# Patient Record
Sex: FEMALE | Race: OTHER | HISPANIC OR LATINO | URBAN - METROPOLITAN AREA
[De-identification: names, ages, dates, MRNs, and addresses within clinical notes are randomized per-mention and may not be internally consistent; named-entity substitution may affect disease eponyms.]

---

## 2023-12-15 NOTE — ASU PATIENT PROFILE, ADULT - NSICDXPASTMEDICALHX_GEN_ALL_CORE_FT
PAST MEDICAL HISTORY:  No pertinent past medical history PAST MEDICAL HISTORY:  Sleep apnea, obstructive

## 2023-12-15 NOTE — ASU PATIENT PROFILE, ADULT - FALL HARM RISK - UNIVERSAL INTERVENTIONS
Bed in lowest position, wheels locked, appropriate side rails in place/Call bell, personal items and telephone in reach/Instruct patient to call for assistance before getting out of bed or chair/Non-slip footwear when patient is out of bed/[place] to call system/Physically safe environment - no spills, clutter or unnecessary equipment/Purposeful Proactive Rounding/Room/bathroom lighting operational, light cord in reach Bed in lowest position, wheels locked, appropriate side rails in place/Call bell, personal items and telephone in reach/Instruct patient to call for assistance before getting out of bed or chair/Non-slip footwear when patient is out of bed/[place] to call system/Physically safe environment - no spills, clutter or unnecessary equipment/Purposeful Proactive Rounding/Room/bathroom lighting operational, light cord in reach

## 2023-12-15 NOTE — ASU PATIENT PROFILE, ADULT - NSICDXPASTSURGICALHX_GEN_ALL_CORE_FT
PAST SURGICAL HISTORY:  No significant past surgical history PAST SURGICAL HISTORY:  H/O  section     H/O gastric sleeve

## 2023-12-15 NOTE — ASU PATIENT PROFILE, ADULT - NS PREOP UNDERSTANDS INFO
Spoke to patient to be NPO/No solid foods after 2200 pm on Sunday night.  allow to drink water or apple juice till  12mn, dress comfortable, leave all valuable  at home,  no jewelry,  no lotions, Bring ID photo and insurance cards,  . escort  arranged,  address and telephone given to  patient/yes

## 2023-12-18 ENCOUNTER — OUTPATIENT (OUTPATIENT)
Dept: OUTPATIENT SERVICES | Facility: HOSPITAL | Age: 38
LOS: 1 days | Discharge: HOME | End: 2023-12-18
Payer: COMMERCIAL

## 2023-12-18 VITALS
DIASTOLIC BLOOD PRESSURE: 74 MMHG | TEMPERATURE: 97 F | OXYGEN SATURATION: 98 % | RESPIRATION RATE: 16 BRPM | HEART RATE: 74 BPM | SYSTOLIC BLOOD PRESSURE: 112 MMHG

## 2023-12-18 VITALS
OXYGEN SATURATION: 98 % | RESPIRATION RATE: 14 BRPM | HEIGHT: 66 IN | HEART RATE: 76 BPM | TEMPERATURE: 97 F | DIASTOLIC BLOOD PRESSURE: 73 MMHG | SYSTOLIC BLOOD PRESSURE: 107 MMHG | WEIGHT: 216.49 LBS

## 2023-12-18 DIAGNOSIS — Z98.891 HISTORY OF UTERINE SCAR FROM PREVIOUS SURGERY: Chronic | ICD-10-CM

## 2023-12-18 DIAGNOSIS — Z90.3 ACQUIRED ABSENCE OF STOMACH [PART OF]: Chronic | ICD-10-CM

## 2023-12-18 PROCEDURE — 88305 TISSUE EXAM BY PATHOLOGIST: CPT | Mod: 26

## 2023-12-18 PROCEDURE — 19318 BREAST REDUCTION: CPT | Mod: AS,50

## 2023-12-18 PROCEDURE — 15834 EXC EXCESSIVE SKIN HIP: CPT | Mod: AS,50

## 2023-12-18 PROCEDURE — 11981 INSERTION DRUG DLVR IMPLANT: CPT | Mod: AS

## 2023-12-18 PROCEDURE — 15847 EXC SKIN ABD ADD-ON: CPT | Mod: AS

## 2023-12-18 PROCEDURE — 15830 EXC EXCESSIVE SKIN ABDOMEN: CPT | Mod: AS

## 2023-12-18 PROCEDURE — 88302 TISSUE EXAM BY PATHOLOGIST: CPT | Mod: 26

## 2023-12-18 DEVICE — VISTASEAL FIBRIN HUMAN 10ML: Type: IMPLANTABLE DEVICE | Status: FUNCTIONAL

## 2023-12-18 RX ORDER — SODIUM CHLORIDE 9 MG/ML
1000 INJECTION, SOLUTION INTRAVENOUS
Refills: 0 | Status: DISCONTINUED | OUTPATIENT
Start: 2023-12-18 | End: 2023-12-18

## 2023-12-18 RX ORDER — ONDANSETRON 8 MG/1
4 TABLET, FILM COATED ORAL ONCE
Refills: 0 | Status: DISCONTINUED | OUTPATIENT
Start: 2023-12-18 | End: 2023-12-18

## 2023-12-18 RX ORDER — ACETAMINOPHEN 500 MG
1000 TABLET ORAL ONCE
Refills: 0 | Status: COMPLETED | OUTPATIENT
Start: 2023-12-18 | End: 2023-12-18

## 2023-12-18 RX ORDER — APREPITANT 80 MG/1
40 CAPSULE ORAL ONCE
Refills: 0 | Status: COMPLETED | OUTPATIENT
Start: 2023-12-18 | End: 2023-12-18

## 2023-12-18 RX ORDER — OXYCODONE HYDROCHLORIDE 5 MG/1
5 TABLET ORAL ONCE
Refills: 0 | Status: DISCONTINUED | OUTPATIENT
Start: 2023-12-18 | End: 2023-12-18

## 2023-12-18 RX ORDER — HYDROMORPHONE HYDROCHLORIDE 2 MG/ML
0.5 INJECTION INTRAMUSCULAR; INTRAVENOUS; SUBCUTANEOUS ONCE
Refills: 0 | Status: DISCONTINUED | OUTPATIENT
Start: 2023-12-18 | End: 2023-12-18

## 2023-12-18 RX ADMIN — Medication 1000 MILLIGRAM(S): at 06:07

## 2023-12-18 RX ADMIN — APREPITANT 40 MILLIGRAM(S): 80 CAPSULE ORAL at 06:08

## 2023-12-18 RX ADMIN — SODIUM CHLORIDE 100 MILLILITER(S): 9 INJECTION, SOLUTION INTRAVENOUS at 12:49

## 2023-12-18 NOTE — PRE-ANESTHESIA EVALUATION ADULT - WEIGHT IN KG
Routine referral from Jeannette Iyre PA-C for Atelectasis    Last OV: 2/1/2022 with Dr. Biggs   IMAGING:  Personally Reviewed High res CT chest 1/14/22:   Very subtle ground glass opacities bilaterally (faint/subtle changes)  - no interstitial changes, no honeycombing.    IMPRESSION:  Reticular and centrilobular changes RIGHT upper lobe, pattern most  suggestive of infectious or inflammatory change.   Multiple small groundglass airspace opacity RIGHT upper lobe suggestive of  infection.  Stable reticular interstitial markings lateral LEFT lung base, also  suggestive of residual chronic infectious /inflammatory change.  No evidence for significant interstitial change, bronchiectasis, or  honeycombing.  Forced expiratory acquisition does not demonstrate groundglass attenuation  or mosaic attenuation.   PULMONARY FUNCTION TEST:     1-14-22       FVC (L/%) 3.04L 101%       FEV1 (L/%) 2.34L 100%       FEV1/FVC 75       TLC(%) 88       RV(%) 58       ERV(%) 88       DLCO(%) 91       Airway resistance 191          Six Minute Walk: 1-14-22  Distance ambulated: 352 m  Oxygen saturations: 99% to 98% on room air    RECOMMENDATIONS:  1. 12 month follow up (patient should have her cardiac calcium CT done before seeing in pulmonary clinic   2. Depending up on what that shows, will most likely either get a HRCT or Low dose CT lung cancer screening.      OV 4/10/2023 with Jeannette Iyer PA-C  She is also due for follow up with Pulmonary Medicine.  In November 21 she had CT cardiac calcium score showing mild interstitial changes of the lungs.  She did well with her 6 minute walk test and has no pulmonary symptoms.  A 12 month follow-up was recommended.    CXR 3/22/2023  FINDINGS:  Stable postoperative cardiomediastinal silhouette and pulmonary  vascularity. Left pleural effusion with improving aeration at the left lung  base. Small right effusion with blunting of the costophrenic sulcus on the  lateral view. No acute osseous  abnormality.  IMPRESSION:   1. Small bilateral effusions, left greater than right.  2. Continued improvement in aeration at the left lung base.    CT Chest PE  3/8/2023  Findings  LUNGS: Mild dependent and linear atelectatic changes are seen in both lower  lobes left more than right side. No airspace consolidation. No obvious  nodule or mass. No interstitial thickening.   IMPRESSION:    No evidence of pulmonary embolism.    No airspace consolidation. No lobar or segmental atelectasis. Dependent and  linear atelectatic changes in both lower lobes left more than right side.  Moderate-sized hiatal hernia is seen with associated adjacent surgical  clips. No enlarged mediastinal or hilar lymph nodes are seen.  No pleural effusion or pneumothorax bilaterally.    KYLEE Flores--Please review and advise   98.2

## 2023-12-18 NOTE — BRIEF OPERATIVE NOTE - NSICDXBRIEFPROCEDURE_GEN_ALL_CORE_FT
PROCEDURES:  Abdominoplasty 18-Dec-2023 09:53:21  Domi Scott  Breast reduction 18-Dec-2023 09:53:38  Domi Scott

## 2023-12-18 NOTE — ASU DISCHARGE PLAN (ADULT/PEDIATRIC) - NS MD DC FALL RISK RISK
For information on Fall & Injury Prevention, visit: [email or web address] OR  [email or web address] OR  https://www.cdc.gov/steadi/patient.html For information on Fall & Injury Prevention, visit: [email or web address] OR  [email or web address] OR  https://www.cdc.gov/steadi/patient.html

## 2023-12-18 NOTE — PACU DISCHARGE NOTE - PAIN:
Controlled with current regime Podiatry consulted and will see the pt and replace wound vac. baseline Hb Pt seen and examined by podiatry. bleeding stopped. wound vac being re-applied. Continue all abx, f/u wound care, f/u podiatry Dr Brumfield

## 2023-12-18 NOTE — ASU DISCHARGE PLAN (ADULT/PEDIATRIC) - NURSING INSTRUCTIONS
DO NOT take any Tylenol (Acetaminophen) or narcotics containing Tylenol until after  12:00pm . You received Tylenol during your operation and it can cause damage to your liver if too much is taken within a 24 hour time period.

## 2023-12-27 LAB
SURGICAL PATHOLOGY STUDY: SIGNIFICANT CHANGE UP
SURGICAL PATHOLOGY STUDY: SIGNIFICANT CHANGE UP

## (undated) DEVICE — DRAIN RESERVOIR FOR JACKSON PRATT 100CC CARDINAL

## (undated) DEVICE — PUMP ON-Q SILVERSOAKER 400ML X 4ML/HR

## (undated) DEVICE — DRSG TELFA 4 X 14

## (undated) DEVICE — WARMING BLANKET FULL UNDERBODY ADULT

## (undated) DEVICE — POSITIONER FOAM EGG CRATE ULNAR 2PCS (PINK)

## (undated) DEVICE — SYR LUER LOK 50CC

## (undated) DEVICE — SUT QUILL MONODERM 2-0 30CM 24MM

## (undated) DEVICE — NDL SPINAL 18G X 3.5" (PINK)

## (undated) DEVICE — Device

## (undated) DEVICE — SUT VICRYL 2-0 27" CT-1 UNDYED

## (undated) DEVICE — SUT MONOCRYL 4-0 27" PS-2 UNDYED

## (undated) DEVICE — DRSG STERISTRIPS 0.5 X 4"

## (undated) DEVICE — DRSG TEGADERM 2.5X3"

## (undated) DEVICE — CLOTH CHLORHEXIDINE GLUCONATE 2%

## (undated) DEVICE — DRSG GAUZE MOISTURIZER 0.5 OZ 4X8

## (undated) DEVICE — SUT SILK 2-0 18" FS

## (undated) DEVICE — ELCTR STRYKER NEPTUNE BLADE COATED, INSULATED 70MM

## (undated) DEVICE — ABDOMINAL BINDER MED/LG 9" X 36"-64"

## (undated) DEVICE — WARMING BLANKET LOWER ADULT

## (undated) DEVICE — TUBING SUCTION NONCONDUCTIVE 6MM X 12FT

## (undated) DEVICE — DRSG STERISTRIPS 1 X 5"

## (undated) DEVICE — ELCTR BOVIE PENCIL BLADE 10FT

## (undated) DEVICE — MARKING PEN W RULER

## (undated) DEVICE — PACK BREAST RECONSTRUCTION

## (undated) DEVICE — DRSG MASTISOL

## (undated) DEVICE — GLV 7.5 PROTEXIS (WHITE)

## (undated) DEVICE — SUT MONOCRYL 3-0 18" PS-1

## (undated) DEVICE — SLV COMPRESSION KNEE MED

## (undated) DEVICE — DRSG XEROFORM 5 X 9"

## (undated) DEVICE — GOWN ROYAL SILK XL

## (undated) DEVICE — SYR LUER LOK 5CC

## (undated) DEVICE — SUT QUILL PDO 0 45CM 36MM

## (undated) DEVICE — DRSG COMBINE 5X9"

## (undated) DEVICE — ELCTR BOVIE TIP BLADE INSULATED 2.75" EDGE

## (undated) DEVICE — ELCTR PENCIL SMOKE EVACUATOR COATED PUSH BUTTON 70MM